# Patient Record
Sex: MALE | Race: WHITE | NOT HISPANIC OR LATINO | Employment: OTHER | URBAN - METROPOLITAN AREA
[De-identification: names, ages, dates, MRNs, and addresses within clinical notes are randomized per-mention and may not be internally consistent; named-entity substitution may affect disease eponyms.]

---

## 2017-01-25 ENCOUNTER — ANESTHESIA EVENT (OUTPATIENT)
Dept: GASTROENTEROLOGY | Facility: CLINIC | Age: 72
End: 2017-01-25
Payer: MEDICARE

## 2017-01-25 NOTE — ANESTHESIA PREPROCEDURE EVALUATION
Anesthesia Evaluation     . Pt has had prior anesthetic.       ROS/MED HX    ENT/Pulmonary:     (+)tobacco use, Past use , . .    Neurologic:  - neg neurologic ROS     Cardiovascular:  - neg cardiovascular ROS       METS/Exercise Tolerance:  >4 METS   Hematologic:  - neg hematologic  ROS       Musculoskeletal:  - neg musculoskeletal ROS       GI/Hepatic:     (+) Other GI/Hepatic diverticulosis      Renal/Genitourinary:  - ROS Renal section negative       Endo:  - neg endo ROS       Psychiatric:  - neg psychiatric ROS       Infectious Disease:  - neg infectious disease ROS       Malignancy:      - no malignancy   Other:    - neg other ROS           Physical Exam  Normal systems: cardiovascular, pulmonary and dental    Airway   Mallampati: I  TM distance: >3 FB  Neck ROM: full    Dental     Cardiovascular       Pulmonary                     Anesthesia Plan      History & Physical Review  History and physical reviewed and following examination; no interval change.    ASA Status:  2 .    NPO Status:  > 8 hours    Plan for General and MAC with Propofol induction. Reason for MAC:  Deep or markedly invasive procedure (G8)  PONV prophylaxis:  Ondansetron (or other 5HT-3) and Dexamethasone or Solumedrol       Postoperative Care  Postoperative pain management:  IV analgesics and Oral pain medications.      Consents  Anesthetic plan, risks, benefits and alternatives discussed with:  Patient..                          .

## 2017-01-27 ENCOUNTER — SURGERY (OUTPATIENT)
Age: 72
End: 2017-01-27

## 2017-01-27 ENCOUNTER — ANESTHESIA (OUTPATIENT)
Dept: GASTROENTEROLOGY | Facility: CLINIC | Age: 72
End: 2017-01-27
Payer: MEDICARE

## 2017-01-27 PROCEDURE — 25000125 ZZHC RX 250: Performed by: NURSE ANESTHETIST, CERTIFIED REGISTERED

## 2017-01-27 RX ORDER — PROPOFOL 10 MG/ML
INJECTION, EMULSION INTRAVENOUS PRN
Status: DISCONTINUED | OUTPATIENT
Start: 2017-01-27 | End: 2017-01-27

## 2017-01-27 RX ORDER — PROPOFOL 10 MG/ML
INJECTION, EMULSION INTRAVENOUS CONTINUOUS PRN
Status: DISCONTINUED | OUTPATIENT
Start: 2017-01-27 | End: 2017-01-27

## 2017-01-27 RX ORDER — LIDOCAINE HYDROCHLORIDE 10 MG/ML
INJECTION, SOLUTION INFILTRATION; PERINEURAL PRN
Status: DISCONTINUED | OUTPATIENT
Start: 2017-01-27 | End: 2017-01-27

## 2017-01-27 RX ADMIN — PROPOFOL 100 MG: 10 INJECTION, EMULSION INTRAVENOUS at 08:55

## 2017-01-27 RX ADMIN — LIDOCAINE HYDROCHLORIDE 10 MG: 10 INJECTION, SOLUTION INFILTRATION; PERINEURAL at 08:55

## 2017-01-27 RX ADMIN — PROPOFOL 100 MCG/KG/MIN: 10 INJECTION, EMULSION INTRAVENOUS at 08:55

## 2017-01-27 ASSESSMENT — LIFESTYLE VARIABLES: TOBACCO_USE: 1

## 2017-01-27 NOTE — ANESTHESIA POSTPROCEDURE EVALUATION
Patient: Alexi Leal    COLONOSCOPY (N/A Rectum)  Additional InformationProcedure(s):  Colonoscopy - Wound Class: II-Clean Contaminated    Diagnosis:screening  Diagnosis Additional Information: No value filed.    Anesthesia Type:  General, MAC    Note:  Anesthesia Post Evaluation    Patient location during evaluation: Bedside  Patient participation: Able to fully participate in evaluation  Level of consciousness: awake  Pain management: adequate  Airway patency: patent  Cardiovascular status: stable  Respiratory status: room air  Hydration status: stable  PONV: none     Anesthetic complications: None          Last vitals:  Filed Vitals:    01/27/17 0824   BP: 125/86   Temp: 36  C (96.8  F)   Resp: 16   SpO2: 99%       Electronically Signed By: ELDA Pathak CRNA  January 27, 2017  9:25 AM

## 2017-01-27 NOTE — ANESTHESIA CARE TRANSFER NOTE
Patient: Alexi Leal    COLONOSCOPY (N/A Rectum)  Additional InformationProcedure(s):  Colonoscopy - Wound Class: II-Clean Contaminated    Diagnosis: screening  Diagnosis Additional Information: No value filed.    Anesthesia Type:   General, MAC     Note:  Airway :Room Air  Patient transferred to:Phase II        Vitals: (Last set prior to Anesthesia Care Transfer)              Electronically Signed By: ELDA Pathak CRNA  January 27, 2017  9:25 AM

## 2021-05-27 ENCOUNTER — RECORDS - HEALTHEAST (OUTPATIENT)
Dept: ADMINISTRATIVE | Facility: CLINIC | Age: 76
End: 2021-05-27

## 2022-08-10 ENCOUNTER — TRANSCRIBE ORDERS (OUTPATIENT)
Dept: OTHER | Age: 77
End: 2022-08-10

## 2022-08-10 DIAGNOSIS — M25.552 BILATERAL HIP PAIN: Primary | ICD-10-CM

## 2022-08-10 DIAGNOSIS — M25.551 BILATERAL HIP PAIN: Primary | ICD-10-CM

## 2022-08-17 ENCOUNTER — HOSPITAL ENCOUNTER (OUTPATIENT)
Dept: PHYSICAL THERAPY | Facility: CLINIC | Age: 77
Setting detail: THERAPIES SERIES
Discharge: HOME | End: 2022-08-17
Attending: ORTHOPAEDIC SURGERY
Payer: COMMERCIAL

## 2022-08-17 DIAGNOSIS — M25.551 BILATERAL HIP PAIN: ICD-10-CM

## 2022-08-17 DIAGNOSIS — M25.552 BILATERAL HIP PAIN: ICD-10-CM

## 2022-08-17 PROCEDURE — 97110 THERAPEUTIC EXERCISES: CPT | Mod: GP | Performed by: PHYSICAL THERAPIST

## 2022-08-17 PROCEDURE — 97161 PT EVAL LOW COMPLEX 20 MIN: CPT | Mod: GP | Performed by: PHYSICAL THERAPIST

## 2022-08-17 NOTE — PROGRESS NOTES
08/17/22 0900   General Information   Type of Visit Initial OP Ortho PT Evaluation   Start of Care Date 08/17/22   Referring Physician Akash Murillo - [place]   Patient/Family Goals Statement Get back to housework-sanju, yardwork   Orders Evaluate and Treat   Date of Order 08/10/22   Certification Required? Yes   Medical Diagnosis B Hip pain   Surgical/Medical history reviewed Yes   Precautions/Limitations bilateral hip precautions  (Often dislocates hip while working on his house)   General Information Comments PMH of B hip surgery 13 years ago, 1 left knee surgery 8 years ago broken bones, high blood pressure, arthritis, RA,   Body Part(s)   Body Part(s) Hip;Lumbar Spine/SI   Presentation and Etiology   Pertinent history of current problem (include personal factors and/or comorbidities that impact the POC) Pt presents to PT w B hip pain. Pt relates B posterior lateral YAQUELIN approx. 16 years ago. Now getting pain in joints after prolonged working. Pt relates R hip will sublux, most recent was approx. 2 months ago. Pt has met calin DIANA and will have CT scan to further evaluate hip components.  Pt would like to get stronger and reduce pain.   Impairments A. Pain;F. Decreased strength and endurance   Functional Limitations perform activities of daily living   Symptom Location No pain   How/Where did it occur From Degenerative Joint Disease   Chronicity New   Pain rating (0-10 point scale) Best (/10);Worst (/10)   Best (/10) 4   Worst (/10) 6   Pain quality B. Dull;C. Aching   Frequency of pain/symptoms C. With activity   Pain/symptoms are: The same all the time   Pain/symptoms exacerbated by B. Walking   Pain/symptoms eased by A. Sitting;C. Rest   Progression of symptoms since onset: Unchanged   Prior Level of Function   Prior Level of Function-Mobility Pt reports continuing to do ourdoor housework-up on roof of house, pt reports no other exercise routines currently.   Current Level of Function   Patient role/employment  history H. 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 No   Have you fallen and had an injury in the past year? No   Is patient a fall risk? No   Fall screen comments 2 times-Tripped on a cord and fell on ice-no injuries from either - Ran out of time to assess tug, SL balance WNL   Abuse Screen (yes response referral indicated)   Feels Unsafe at Home or Work/School no   Feels Threatened by Someone no   Does Anyone Try to Keep You From Having Contact with Others or Doing Things Outside Your Home? no   Physical Signs of Abuse Present no   Functional Scales   Functional Scales Other   Other Scales  LEFS:69/80   Hip Objective Findings   Balance/Proprioception (Single Leg Stance) Unable to stand single leg EC without support   Hip ROM Comments Did not test IR/ER due to prior hip replacements   Lumbar ROM WNL   Posture Laying back in chair, lumbar lordosis   Lumbar Spine/SI Objective Findings   Gait/Locomotion WNL - slightly decreased gait speed   Balance/Proprioception (Single Leg Stance) Takes time to stablize but able to hold approx 5 Secs B   Flexion ROM To floor   Extension ROM Bending knees   Right Side Bending ROM Limited - no pain   Left Side Bending ROM Limited - no pain   Hip Screen WNL   Hip Abduction Strength 3+/5 B   Knee Flexion Strength 5/5   Knee Extension (L3) Strength 5/5   Planned Therapy Interventions   Planned Therapy Interventions balance training;gait training;manual therapy;neuromuscular re-education;strengthening   Planned Modality Interventions   Planned Modality Interventions Comments as needed   Clinical Impression   Criteria for Skilled Therapeutic Interventions Met yes, treatment indicated   PT Diagnosis General LE deconditioning, balance impariments due to B hip pain   Influenced by the following impairments Decreased B hip abduction strength,  decreased B single leg balance, decreased TA strength   Functional limitations due to impairments ADL's, outdoor housework   Clinical Presentation Stable/Uncomplicated   Clinical Presentation Rationale LE weakness with no change   Clinical Decision Making (Complexity) Low complexity   Therapy Frequency 1 time/week   Predicted Duration of Therapy Intervention (days/wks) 8 weeks   Risk & Benefits of therapy have been explained Yes   Patient, Family & other staff in agreement with plan of care Yes   Clinical Impression Comments Pt is a 78 y/o male referred to PT for B hip pain. PMH includes B hip surgery 13 years ago, 1 left knee surgery 8 years ago broken bones, high blood pressure, arthritis and RA. Patient demonstrates decreased hip abduction and extension strength, core strength and decreased single leg balance impacting his ability  to complete work around the house and complete ADL's including tying his shoes without pain or difficulty.  Skilled PT is necessary to address strength and balance limitations and allow patient to return to his household work.   Education Assessment   Preferred Learning Style Listening;Reading;Demonstration;Pictures/video   Barriers to Learning No barriers   ORTHO GOALS   PT Ortho Eval Goals 1;2;3;4   Ortho Goal 1   Goal Identifier Strength   Goal Description Pt will deomstrate 5/5 hip abduction strength in order to decrease fatigue and soreness with ADL's, walking, and ourdoor housework   Target Date 10/12/22   Date Met 09/14/22   Ortho Goal 2   Goal Identifier HEP   Goal Description Pt will be demonstrate understanding of HEP in order to progress strengthening and decrease pain.   Target Date 10/12/22   Ortho Goal 3   Goal Identifier Balance   Goal Description Pt will be able to do a single leg stance with EC without hand support for 20 seconds.   Target Date 09/14/22   Ortho Goal 4   Goal Identifier Pain   Goal Description Pt will relates less than 1/10 pain  with ADL's and HEP to  return to housework and maintain community mobility   Target Date 10/12/22   Total Evaluation Time   PT Vivi, Low Complexity Minutes (70045) 25   Therapy Certification   Certification date from 08/17/22   Certification date to 10/12/22   Medical Diagnosis B Hip pain

## 2022-09-26 NOTE — PROGRESS NOTES
Outpatient Physical Therapy Discharge Note     Patient: Alexi Leal  : 1945    Evaluation date:  22     Referring Provider: [place]    Therapy Diagnosis: B hip pain    Client Self Report:   Current status is unknown since patient did not return for further PT visits.    Objective Measurements:  Current objective status is unknown since patient failed to complete treatment     Goals:  Goal Identifier Strength   Goal Description Pt will deomstrate 5/5 hip abduction strength in order to decrease fatigue and soreness with ADL's, walking, and ourdoor housework   Target Date 10/12/22   Date Met  22   Progress (detail required for progress note):       Goal Identifier HEP   Goal Description Pt will be demonstrate understanding of HEP in order to progress strengthening and decrease pain.   Target Date 10/12/22   Date Met      Progress (detail required for progress note):       Goal Identifier Balance   Goal Description Pt will be able to do a single leg stance with EC without hand support for 20 seconds.   Target Date 22   Date Met      Progress (detail required for progress note):       Goal Identifier Pain   Goal Description Pt will relates less than 1/10 pain  with ADL's and HEP to return to housework and maintain community mobility   Target Date 10/12/22   Date Met      Progress (detail required for progress note):       Goal Identifier     Goal Description     Target Date     Date Met      Progress (detail required for progress note):       Goal Identifier     Goal Description     Target Date     Date Met      Progress (detail required for progress note):       Goal Identifier     Goal Description     Target Date     Date Met      Progress (detail required for progress note):       Goal Identifier     Goal Description     Target Date     Date Met      Progress (detail required for progress note):         Progress towards Goals:   Progress this reporting period: Pt has failed to schedule f/u  visits within 30 days from last visit thus is being d/c from therapy at this time. Objective measures are all taken from last visit. Current status is unknown at this time.    Plan:  Discharge from therapy.    Discharge:    Reason for Discharge: Patient has failed to schedule further appointments.    Equipment Issued: none    Discharge Plan:  Not completed since patient failed to schedule further appointments.    Aubrie Durant  Physical Therapist  [address] [address]  [email or web address]   [email or web address]   Office: [phone] Fax: [phone]

## 2023-09-26 ENCOUNTER — TRANSCRIBE ORDERS (OUTPATIENT)
Dept: OTHER | Age: 78
End: 2023-09-26

## 2023-09-26 DIAGNOSIS — Z12.11 SCREEN FOR COLON CANCER: Primary | ICD-10-CM

## 2024-01-04 ENCOUNTER — TRANSCRIBE ORDERS (OUTPATIENT)
Dept: OTHER | Age: 79
End: 2024-01-04

## 2024-01-04 DIAGNOSIS — Z12.11 COLON CANCER SCREENING: Primary | ICD-10-CM

## 2024-04-08 ENCOUNTER — ANESTHESIA EVENT (OUTPATIENT)
Dept: GASTROENTEROLOGY | Facility: CLINIC | Age: 79
End: 2024-04-08
Payer: COMMERCIAL

## 2024-04-08 ASSESSMENT — LIFESTYLE VARIABLES: TOBACCO_USE: 1

## 2024-04-08 NOTE — ANESTHESIA PREPROCEDURE EVALUATION
Anesthesia Pre-Procedure Evaluation    Patient: Alexi Leal   MRN: [id number] : 1945        Procedure : Procedure(s):  Colonoscopy          No past medical history on file.   Past Surgical History:   Procedure Laterality Date    BACK SURGERY      COLONOSCOPY N/A 2017    Procedure: COLONOSCOPY;  Surgeon: Chandler Saenz MD;  Location: [facility]    JOINT REPLACEMENT Bilateral     hip    RELEASE CARPAL TUNNEL      ZZC TOTAL KNEE ARTHROPLASTY Left 2015    Procedure: LEFT KNEE TOTAL ARTHROPLASTY;  Surgeon: Kilo Walker MD;  Location: [facility] OR;  Service: Orthopedics      Allergies   Allergen Reactions    No Known Drug Allergy       Social History     Tobacco Use    Smoking status: Former    Smokeless tobacco: Not on file   Substance Use Topics    Alcohol use: Yes     Comment: 1 drink daily      Wt Readings from Last 1 Encounters:   17 104.3 kg (230 lb)        Anesthesia Evaluation   Pt has had prior anesthetic.     History of anesthetic complications  - PONV.      ROS/MED HX  ENT/Pulmonary:     (+)                tobacco use, Past use,                       Neurologic:  - neg neurologic ROS     Cardiovascular:     (+)  hypertension- -   -  - -                                      METS/Exercise Tolerance: >4 METS    Hematologic:  - neg hematologic  ROS     Musculoskeletal:   (+)  arthritis,             GI/Hepatic:  - neg GI/hepatic ROS     Renal/Genitourinary:  - neg Renal ROS     Endo:  - neg endo ROS     Psychiatric/Substance Use:  - neg psychiatric ROS     Infectious Disease:  - neg infectious disease ROS     Malignancy:  - neg malignancy ROS     Other:  - neg other ROS          Physical Exam    Airway  airway exam normal      Mallampati: II   TM distance: > 3 FB   Neck ROM: full   Mouth opening: > 3 cm    Respiratory Devices and Support         Dental       (+) Minor Abnormalities - some fillings, tiny chips      Cardiovascular   cardiovascular exam normal       "    Pulmonary   pulmonary exam normal                OUTSIDE LABS:  CBC: No results found for: \"WBC\", \"HGB\", \"HCT\", \"PLT\"  BMP: No results found for: \"NA\", \"POTASSIUM\", \"CHLORIDE\", \"CO2\", \"BUN\", \"CR\", \"GLC\"  COAGS: No results found for: \"PTT\", \"INR\", \"FIBR\"  POC:   Lab Results   Component Value Date     (H) 04/25/2007     HEPATIC: No results found for: \"ALBUMIN\", \"PROTTOTAL\", \"ALT\", \"AST\", \"GGT\", \"ALKPHOS\", \"BILITOTAL\", \"BILIDIRECT\", \"NEGRITA\"  OTHER:   Lab Results   Component Value Date    A1C 5.9 05/02/2015       Anesthesia Plan    ASA Status:  3    NPO Status:  NPO Appropriate    Anesthesia Type: General.     - Airway: Native airway   Induction: Propofol.   Maintenance: TIVA.        Consents    Anesthesia Plan(s) and associated risks, benefits, and realistic alternatives discussed. Questions answered and patient/representative(s) expressed understanding.     - Discussed: Risks, Benefits and Alternatives for BOTH SEDATION and the PROCEDURE were discussed     - Discussed with:  Patient      - Extended Intubation/Ventilatory Support Discussed: No.      - Patient is DNR/DNI Status: No     Use of blood products discussed: No .     Postoperative Care       PONV prophylaxis: Background Propofol Infusion     Comments:               Keagan Grossman CRNA, APRN CRNA    I have reviewed the pertinent notes and labs in the chart from the past 30 days and (re)examined the patient.  Any updates or changes from those notes are reflected in this note.                  "

## 2024-04-09 ENCOUNTER — ANESTHESIA (OUTPATIENT)
Dept: GASTROENTEROLOGY | Facility: CLINIC | Age: 79
End: 2024-04-09
Payer: COMMERCIAL

## 2024-04-09 ENCOUNTER — HOSPITAL ENCOUNTER (OUTPATIENT)
Facility: CLINIC | Age: 79
Discharge: HOME | End: 2024-04-09
Attending: SURGERY | Admitting: SURGERY
Payer: COMMERCIAL

## 2024-04-09 VITALS
HEART RATE: 77 BPM | SYSTOLIC BLOOD PRESSURE: 90 MMHG | OXYGEN SATURATION: 96 % | HEIGHT: 72 IN | TEMPERATURE: 97.6 F | RESPIRATION RATE: 16 BRPM | BODY MASS INDEX: 31.15 KG/M2 | DIASTOLIC BLOOD PRESSURE: 69 MMHG | WEIGHT: 230 LBS

## 2024-04-09 LAB — COLONOSCOPY: NORMAL

## 2024-04-09 PROCEDURE — 250N000009 HC RX 250: Performed by: NURSE ANESTHETIST, CERTIFIED REGISTERED

## 2024-04-09 PROCEDURE — 258N000003 HC RX IP 258 OP 636: Performed by: PHYSICIAN ASSISTANT

## 2024-04-09 PROCEDURE — 250N000011 HC RX IP 250 OP 636: Performed by: NURSE ANESTHETIST, CERTIFIED REGISTERED

## 2024-04-09 PROCEDURE — 250N000009 HC RX 250: Performed by: PHYSICIAN ASSISTANT

## 2024-04-09 PROCEDURE — 370N000017 HC ANESTHESIA TECHNICAL FEE, PER MIN: Performed by: SURGERY

## 2024-04-09 PROCEDURE — G0121 COLON CA SCRN NOT HI RSK IND: HCPCS | Performed by: SURGERY

## 2024-04-09 PROCEDURE — 45378 DIAGNOSTIC COLONOSCOPY: CPT | Performed by: SURGERY

## 2024-04-09 RX ORDER — LIDOCAINE 40 MG/G
CREAM TOPICAL
Status: DISCONTINUED | OUTPATIENT
Start: 2024-04-09 | End: 2024-04-09 | Stop reason: HOSPADM

## 2024-04-09 RX ORDER — ROSUVASTATIN CALCIUM 10 MG/1
10 TABLET, COATED ORAL DAILY
COMMUNITY

## 2024-04-09 RX ORDER — SODIUM CHLORIDE, SODIUM LACTATE, POTASSIUM CHLORIDE, CALCIUM CHLORIDE 600; 310; 30; 20 MG/100ML; MG/100ML; MG/100ML; MG/100ML
INJECTION, SOLUTION INTRAVENOUS CONTINUOUS
Status: DISCONTINUED | OUTPATIENT
Start: 2024-04-09 | End: 2024-04-09 | Stop reason: HOSPADM

## 2024-04-09 RX ORDER — PROPOFOL 10 MG/ML
INJECTION, EMULSION INTRAVENOUS CONTINUOUS PRN
Status: DISCONTINUED | OUTPATIENT
Start: 2024-04-09 | End: 2024-04-09

## 2024-04-09 RX ORDER — LIDOCAINE HYDROCHLORIDE 20 MG/ML
INJECTION, SOLUTION INFILTRATION; PERINEURAL PRN
Status: DISCONTINUED | OUTPATIENT
Start: 2024-04-09 | End: 2024-04-09

## 2024-04-09 RX ADMIN — LIDOCAINE HYDROCHLORIDE 0.1 ML: 10 INJECTION, SOLUTION EPIDURAL; INFILTRATION; INTRACAUDAL; PERINEURAL at 08:04

## 2024-04-09 RX ADMIN — SODIUM CHLORIDE, POTASSIUM CHLORIDE, SODIUM LACTATE AND CALCIUM CHLORIDE: 600; 310; 30; 20 INJECTION, SOLUTION INTRAVENOUS at 08:03

## 2024-04-09 RX ADMIN — LIDOCAINE HYDROCHLORIDE 100 MG: 20 INJECTION, SOLUTION INFILTRATION; PERINEURAL at 08:24

## 2024-04-09 RX ADMIN — PROPOFOL 150 MCG/KG/MIN: 10 INJECTION, EMULSION INTRAVENOUS at 08:24

## 2024-04-09 ASSESSMENT — ACTIVITIES OF DAILY LIVING (ADL)
ADLS_ACUITY_SCORE: 35
ADLS_ACUITY_SCORE: 35

## 2024-04-09 NOTE — ANESTHESIA CARE TRANSFER NOTE
Patient: Alexi Leal    Procedure: Procedure(s):  Colonoscopy       Diagnosis: Colon cancer screening [Z12.11]  Diagnosis Additional Information: No value filed.    Anesthesia Type:   General     Note:    Oropharynx: oropharynx clear of all foreign objects  Level of Consciousness: awake  Oxygen Supplementation: room air    Independent Airway: airway patency satisfactory and stable  Dentition: dentition unchanged  Vital Signs Stable: post-procedure vital signs reviewed and stable  Report to RN Given: handoff report given  Patient transferred to: Phase II    Handoff Report: Identifed the Patient, Identified the Reponsible Provider, Reviewed the pertinent medical history, Discussed the surgical course, Reviewed Intra-OP anesthesia mangement and issues during anesthesia, Set expectations for post-procedure period and Allowed opportunity for questions and acknowledgement of understanding      Vitals:  Vitals Value Taken Time   /64 04/09/24 0848   Temp 36.4  C (97.6  F) 04/09/24 0848   Pulse 82 04/09/24 0848   Resp 18 04/09/24 0848   SpO2 96 % 04/09/24 0855   Vitals shown include unfiled device data.    Electronically Signed By: ELDA Torres CRNA  April 9, 2024  8:56 AM

## 2024-04-09 NOTE — ANESTHESIA POSTPROCEDURE EVALUATION
Patient: Alexi Leal    Procedure: Procedure(s):  Colonoscopy       Anesthesia Type:  General    Note:  Disposition: Outpatient   Postop Pain Control: Uneventful            Sign Out: Well controlled pain   PONV: No   Neuro/Psych: Uneventful            Sign Out: Acceptable/Baseline neuro status   Airway/Respiratory: Uneventful            Sign Out: Acceptable/Baseline resp. status   CV/Hemodynamics: Uneventful            Sign Out: Acceptable CV status; No obvious hypovolemia; No obvious fluid overload   Other NRE: NONE   DID A NON-ROUTINE EVENT OCCUR? No           Last vitals:  Vitals Value Taken Time   /82 04/09/24 0915   Temp 36.4  C (97.6  F) 04/09/24 0848   Pulse 70 04/09/24 0915   Resp 16 04/09/24 0900   SpO2 97 % 04/09/24 0913   Vitals shown include unfiled device data.    Electronically Signed By: ELDA Ross CRNA  April 9, 2024  1:11 PM

## 2024-04-09 NOTE — H&P
[facility]    Pre-Endoscopy History and Physical     Alexi Leal MRN# [id number]   YOB: 1945 Age: 79 year old     Date of Procedure: 4/9/2024  Primary care provider: Leida Brenner  Type of Endoscopy: Colonoscopy with possible biopsy, possible polypectomy  Reason for Procedure: Screen for colon cancer   Type of Anesthesia Anticipated: MAC    HPI:    Alexi is a 79 year old male who will be undergoing the above procedure. Last colonoscopy Jan 2017 with diverticulosis seen. Positive family history of colon cancer in mother. No anticoagulation use.     A history and physical has been performed. The patient's medications and allergies have been reviewed. The risks and benefits of the procedure and the sedation options and risks were discussed with the patient.  All questions were answered and informed consent was obtained.      He denies a personal or family history of anesthesia complications or bleeding disorders.     Patient Active Problem List   Diagnosis    Osteoarthrosis, unspecified whether generalized or localized, lower leg    PONV (postoperative nausea and vomiting)    Spinal stenosis    IFG (impaired fasting glucose)    Hypertension    Snoring disorder    Acute blood loss anemia        No past medical history on file.     Past Surgical History:   Procedure Laterality Date    BACK SURGERY      COLONOSCOPY N/A 1/27/2017    Procedure: COLONOSCOPY;  Surgeon: Chandler Saenz MD;  Location: [facility]    JOINT REPLACEMENT Bilateral     hip    RELEASE CARPAL TUNNEL      ZZC TOTAL KNEE ARTHROPLASTY Left 5/1/2015    Procedure: LEFT KNEE TOTAL ARTHROPLASTY;  Surgeon: Kilo Walker MD;  Location: [facility];  Service: Orthopedics       Social History     Tobacco Use    Smoking status: Former    Smokeless tobacco: Not on file   Substance Use Topics    Alcohol use: Yes     Comment: 1 drink daily       Family History   Problem Relation Age of Onset    Cancer  Daughter         skin ca    Colon Cancer Mother     Clotting Disorder No family hx of        Prior to Admission medications    Medication Sig Start Date End Date Taking? Authorizing Provider   ASPIRIN PO Take 81 mg by mouth daily     Reported, Patient   Cephalexin (KEFLEX PO) Take 1 capsule by mouth daily as needed (Take prn when goes to dentist. Has 2 artificial hips and a knee. Pt asked to contact Ortho surgeon to see if he needs it before colonoscopy.)    Reported, Patient   doxycycline Monohydrate 100 MG CAPS Take 1 capsule (100 mg) by mouth daily with food 10/18/16   Ariella Gilmore PA-C   HYDROCHLOROTHIAZIDE PO     Reported, Patient   LISINOPRIL PO Take 20 mg by mouth daily    Reported, Patient       Allergies   Allergen Reactions    No Known Drug Allergy         REVIEW OF SYSTEMS:   5 point ROS negative except as noted above in HPI, including Gen., Resp., CV, GI &  system review.    PHYSICAL EXAM:   BP (!) 143/74   Temp 98  F (36.7  C) (Oral)   Resp 16   Ht 1.829 m (6')   Wt 104.3 kg (230 lb)   SpO2 98%   BMI 31.19 kg/m   Estimated body mass index is 31.19 kg/m  as calculated from the following:    Height as of this encounter: 1.829 m (6').    Weight as of this encounter: 104.3 kg (230 lb).   Constitutional: Awake, alert, no acute distress.  Eyes: No scleral icterus.  Conjunctiva are without injection.  ENMT: Mucous membranes moist, dentition and gums are intact.   Neck: Soft, supple, trachea midline.    Endocrine: n/a   Lymphatic: There is no cervical, submandibularadenopathy.  Respiratory: normal effortgs   Cardiovascular: S1, S2  Abdomen: Non-distended, non-tender,  No masses,  Musculoskeletal: No spinal or CVA tenderness. Full range of motion in the upper and lower extremities.    Skin: No skin rashes or lesions to inspection.  No petechia.    Neurologic: alerted and oriented 3x  Psychiatric: The patient's affect is not blunted and mood is appropriate.  DIAGNOSTICS:    Not  indicated    IMPRESSION   ASA Class 2 - Mild systemic disease    PLAN:   Plan for Colonoscopy with possible biopsy, possible polypectomy. We discussed the risks, benefits and alternatives and the patient wished to proceed.  Patient is cleared for the above procedure.    The above has been forwarded to the consulting provider.    Elke Solis DO, PGY - 2   [facility]

## 2025-07-10 ENCOUNTER — OFFICE VISIT (OUTPATIENT)
Dept: URGENT CARE | Facility: URGENT CARE | Age: 80
End: 2025-07-10
Payer: COMMERCIAL

## 2025-07-10 VITALS
WEIGHT: 222 LBS | OXYGEN SATURATION: 98 % | HEART RATE: 81 BPM | RESPIRATION RATE: 16 BRPM | SYSTOLIC BLOOD PRESSURE: 156 MMHG | TEMPERATURE: 98.4 F | DIASTOLIC BLOOD PRESSURE: 71 MMHG | HEIGHT: 72 IN | BODY MASS INDEX: 30.07 KG/M2

## 2025-07-10 DIAGNOSIS — R07.0 THROAT PAIN: ICD-10-CM

## 2025-07-10 DIAGNOSIS — J22 LOWER RESPIRATORY INFECTION (E.G., BRONCHITIS, PNEUMONIA, PNEUMONITIS, PULMONITIS): Primary | ICD-10-CM

## 2025-07-10 LAB
DEPRECATED S PYO AG THROAT QL EIA: NEGATIVE
S PYO DNA THROAT QL NAA+PROBE: NOT DETECTED
SARS-COV-2 RNA RESP QL NAA+PROBE: NEGATIVE

## 2025-07-10 RX ORDER — AZITHROMYCIN 250 MG/1
TABLET, FILM COATED ORAL
Qty: 6 TABLET | Refills: 0 | Status: SHIPPED | OUTPATIENT
Start: 2025-07-10 | End: 2025-07-15

## 2025-07-10 RX ORDER — BENZONATATE 200 MG/1
200 CAPSULE ORAL 3 TIMES DAILY PRN
Qty: 40 CAPSULE | Refills: 0 | Status: SHIPPED | OUTPATIENT
Start: 2025-07-10

## 2025-07-10 ASSESSMENT — ENCOUNTER SYMPTOMS
HEMATURIA: 0
PALPITATIONS: 0
HEADACHES: 0
SORE THROAT: 1
NAUSEA: 0
GASTROINTESTINAL NEGATIVE: 1
CARDIOVASCULAR NEGATIVE: 1
COUGH: 1
MYALGIAS: 0
ALLERGIC/IMMUNOLOGIC NEGATIVE: 1
FREQUENCY: 0
CHILLS: 0
SINUS PRESSURE: 0
SINUS PAIN: 0
CONSTITUTIONAL NEGATIVE: 1
DIARRHEA: 0
SHORTNESS OF BREATH: 0
DYSURIA: 0
NEUROLOGICAL NEGATIVE: 1
VOMITING: 0
WHEEZING: 0
ABDOMINAL PAIN: 0
CHEST TIGHTNESS: 0
MUSCULOSKELETAL NEGATIVE: 1

## 2025-07-10 NOTE — PROGRESS NOTES
Urgent Care Clinic Visit    Chief Complaint   Patient presents with    Pharyngitis     Sore throat and a cough x 2 weeks.                7/10/2025    10:48 AM   Additional Questions   Roomed by Ann POSEY

## 2025-07-10 NOTE — PROGRESS NOTES
Chief Complaint:     Chief Complaint   Patient presents with    Pharyngitis     Sore throat and a cough x 2 weeks.       Results for orders placed or performed in visit on 07/10/25   Streptococcus A Rapid Screen w/Reflex to PCR - Clinic Collect     Status: Normal    Specimen: Throat; Swab   Result Value Ref Range    Group A Strep antigen Negative Negative       Medical Decision Making:    Vital signs reviewed by Ej Wilson PA-C  BP (!) 156/71   Pulse 81   Temp 98.4  F (36.9  C) (Tympanic)   Resp 16   Ht 1.829 m (6')   Wt 100.7 kg (222 lb)   SpO2 98%   BMI 30.11 kg/m      Differential Diagnosis:  URI Adult/Peds:  Bronchitis-viral, Pneumonia, Strep pharyngitis, Tonsilitis, Viral pharyngitis, Viral syndrome, and Viral upper respiratory illness        ASSESSMENT    1. Lower respiratory infection (e.g., bronchitis, pneumonia, pneumonitis, pulmonitis)    2. Throat pain        PLAN    Patient is in no acute distress.    Temp is 98.4 in clinic today, lung sounds were clear, and O2 sats at 98% on RA.    RST was negative.  We will call with PCR results only if positive.  COVID swab collected in clinic today.  With length of symptoms, Rx for Zithromax sent in.  Rest, Push fluids, vaporizer, elevation of head of bed.  Ibuprofen and or Tylenol for any fever or body aches.  Rx for Tessalon cough suppressant- PRN- as discussed.   If symptoms worsen, recheck immediately otherwise follow up with your PCP in 1 week if symptoms are not improving.  Worrisome symptoms discussed with instructions to go to the ED.  Patient verbalized understanding and agreed with this plan.    Labs:    Results for orders placed or performed in visit on 07/10/25   Streptococcus A Rapid Screen w/Reflex to PCR - Clinic Collect     Status: Normal    Specimen: Throat; Swab   Result Value Ref Range    Group A Strep antigen Negative Negative        Vital signs reviewed by Ej Wilson PA-C  BP (!) 156/71   Pulse 81   Temp 98.4  F (36.9  C)  (Tympanic)   Resp 16   Ht 1.829 m (6')   Wt 100.7 kg (222 lb)   SpO2 98%   BMI 30.11 kg/m      Current Meds      Current Outpatient Medications:     azithromycin (ZITHROMAX) 250 MG tablet, Take 2 tablets (500 mg) by mouth daily for 1 day, THEN 1 tablet (250 mg) daily for 4 days., Disp: 6 tablet, Rfl: 0    benzonatate (TESSALON) 200 MG capsule, Take 1 capsule (200 mg) by mouth 3 times daily as needed for cough., Disp: 40 capsule, Rfl: 0    Cephalexin (KEFLEX PO), Take 1 capsule by mouth daily as needed (Take prn when goes to dentist. Has 2 artificial hips and a knee. Pt asked to contact Ortho surgeon to see if he needs it before colonoscopy.), Disp: , Rfl:     HYDROCHLOROTHIAZIDE PO, , Disp: , Rfl:     LISINOPRIL PO, Take 20 mg by mouth daily, Disp: , Rfl:     rosuvastatin (CRESTOR) 10 MG tablet, Take 10 mg by mouth daily, Disp: , Rfl:       Respiratory History    occasional episodes of bronchitis      SUBJECTIVE    HPI: Alexi Leal is an 80 year old male who presents with chest congestion, cough nonproductive, occasional, and sore throat.  Symptoms began 2  weeks ago and has unchanged.  There is no shortness of breath, wheezing, and chest pain.  Patient is eating and drinking well.  No fever, nausea, vomiting, or diarrhea.    Patient denies any recent travel or exposure to known COVID positive tested individual.      Patient is new to [facility].      ROS:     Review of Systems   Constitutional: Negative.  Negative for chills.   HENT:  Positive for congestion and sore throat. Negative for sinus pressure and sinus pain.    Respiratory:  Positive for cough. Negative for chest tightness, shortness of breath and wheezing.    Cardiovascular: Negative.  Negative for chest pain and palpitations.   Gastrointestinal: Negative.  Negative for abdominal pain, diarrhea, nausea and vomiting.   Genitourinary:  Negative for dysuria, frequency, hematuria and urgency.   Musculoskeletal: Negative.  Negative for  myalgias.   Skin:  Negative for rash.   Allergic/Immunologic: Negative.  Negative for immunocompromised state.   Neurological: Negative.  Negative for headaches.         Family History   Family History   Problem Relation Age of Onset    Cancer Daughter         skin ca    Colon Cancer Mother     Clotting Disorder No family hx of         Problem history  Patient Active Problem List   Diagnosis    Osteoarthrosis, unspecified whether generalized or localized, lower leg    PONV (postoperative nausea and vomiting)    Spinal stenosis    IFG (impaired fasting glucose)    Hypertension    Snoring disorder    Acute blood loss anemia        Allergies  Allergies   Allergen Reactions    Acetaminophen      had nausea; narcotic side effects are dose-related - seems to do well with low dose of vicodin    No Known Drug Allergy     Adhesive Tape Rash     tapes        Social History  Social History     Socioeconomic History    Marital status:      Spouse name: Not on file    Number of children: Not on file    Years of education: Not on file    Highest education level: Not on file   Occupational History    Not on file   Tobacco Use    Smoking status: Former    Smokeless tobacco: Not on file   Substance and Sexual Activity    Alcohol use: Yes     Comment: 1 drink daily    Drug use: No    Sexual activity: Not on file   Other Topics Concern    Parent/sibling w/ CABG, MI or angioplasty before 65F 55M? Not Asked   Social History Narrative    Not on file     Social Drivers of Health     Financial Resource Strain: Not on file   Food Insecurity: Not on file   Transportation Needs: Not on file   Physical Activity: Not on file   Stress: Not on file   Social Connections: Not on file   Interpersonal Safety: Not on file   Housing Stability: Not on file        OBJECTIVE     Vital signs reviewed by Ej Wilson PA-C  BP (!) 156/71   Pulse 81   Temp 98.4  F (36.9  C) (Tympanic)   Resp 16   Ht 1.829 m (6')   Wt 100.7 kg (222 lb)   SpO2  98%   BMI 30.11 kg/m       Physical Exam  Vitals reviewed.   Constitutional:       General: He is not in acute distress.     Appearance: He is well-developed. He is not ill-appearing, toxic-appearing or diaphoretic.   HENT:      Head: Normocephalic and atraumatic.      Right Ear: Hearing, tympanic membrane, ear canal and external ear normal. No drainage, swelling or tenderness. Tympanic membrane is not perforated, erythematous, retracted or bulging.      Left Ear: Hearing, tympanic membrane, ear canal and external ear normal. No drainage, swelling or tenderness. Tympanic membrane is not perforated, erythematous, retracted or bulging.      Nose: Congestion and rhinorrhea present. No nasal tenderness or mucosal edema.      Right Turbinates: Not enlarged or swollen.      Left Turbinates: Not enlarged or swollen.      Right Sinus: No maxillary sinus tenderness or frontal sinus tenderness.      Left Sinus: No maxillary sinus tenderness or frontal sinus tenderness.      Mouth/Throat:      Pharynx: Posterior oropharyngeal erythema present. No pharyngeal swelling, oropharyngeal exudate or uvula swelling.      Tonsils: No tonsillar exudate. 0 on the right. 0 on the left.   Eyes:      General: Lids are normal.         Right eye: No discharge.         Left eye: No discharge.      Conjunctiva/sclera: Conjunctivae normal.      Right eye: Right conjunctiva is not injected. No exudate.     Left eye: Left conjunctiva is not injected. No exudate.     Pupils: Pupils are equal, round, and reactive to light.   Cardiovascular:      Rate and Rhythm: Normal rate and regular rhythm.      Heart sounds: Normal heart sounds. No murmur heard.     No friction rub. No gallop.   Pulmonary:      Effort: Pulmonary effort is normal. No accessory muscle usage, respiratory distress or retractions.      Breath sounds: Normal breath sounds and air entry. No stridor, decreased air movement or transmitted upper airway sounds. No decreased breath sounds,  wheezing, rhonchi or rales.   Chest:      Chest wall: No tenderness.   Abdominal:      General: Bowel sounds are normal. There is no distension.      Palpations: Abdomen is soft. Abdomen is not rigid. There is no mass.      Tenderness: There is no abdominal tenderness. There is no guarding or rebound.   Musculoskeletal:         General: Normal range of motion.      Cervical back: Normal range of motion and neck supple.   Lymphadenopathy:      Head:      Right side of head: No submental, submandibular, tonsillar, preauricular or posterior auricular adenopathy.      Left side of head: No submental, submandibular, tonsillar, preauricular or posterior auricular adenopathy.      Cervical:      Right cervical: No superficial or posterior cervical adenopathy.     Left cervical: No superficial or posterior cervical adenopathy.   Skin:     General: Skin is warm.      Capillary Refill: Capillary refill takes less than 2 seconds.   Neurological:      Mental Status: He is alert and oriented to person, place, and time.      Cranial Nerves: No cranial nerve deficit.      Sensory: No sensory deficit.      Motor: No abnormal muscle tone.      Coordination: Coordination normal.      Deep Tendon Reflexes: Reflexes normal.   Psychiatric:         Behavior: Behavior normal. Behavior is cooperative.         Thought Content: Thought content normal.         Judgment: Judgment normal.           Ej Wilson PA-C  7/10/2025, 10:06 AM

## (undated) RX ORDER — PROPOFOL 10 MG/ML
INJECTION, EMULSION INTRAVENOUS
Status: DISPENSED
Start: 2024-04-09